# Patient Record
Sex: MALE | Race: WHITE | URBAN - METROPOLITAN AREA
[De-identification: names, ages, dates, MRNs, and addresses within clinical notes are randomized per-mention and may not be internally consistent; named-entity substitution may affect disease eponyms.]

---

## 2017-02-28 ENCOUNTER — PRE VISIT (OUTPATIENT)
Dept: OTOLARYNGOLOGY | Facility: CLINIC | Age: 27
End: 2017-02-28

## 2017-02-28 NOTE — TELEPHONE ENCOUNTER
1.  Date/reason for appt:  3/08/17   Broken Nose    2.  Referring provider:  Self    3.  Call to patient (Yes / No - short description):  No, spouse stated no outside records.  New issue, nothing to gather..  Records reviewed.  All records are in Epic

## 2017-03-08 ENCOUNTER — OFFICE VISIT (OUTPATIENT)
Dept: OTOLARYNGOLOGY | Facility: CLINIC | Age: 27
End: 2017-03-08

## 2017-03-08 VITALS — WEIGHT: 187 LBS | HEIGHT: 72 IN | BODY MASS INDEX: 25.33 KG/M2

## 2017-03-08 DIAGNOSIS — Z87.81 HX OF FRACTURE OF NOSE: ICD-10-CM

## 2017-03-08 DIAGNOSIS — J34.89 NASAL OBSTRUCTION: Primary | ICD-10-CM

## 2017-03-08 DIAGNOSIS — J34.2 DEVIATED NASAL SEPTUM: ICD-10-CM

## 2017-03-08 DIAGNOSIS — M95.0 ACQUIRED NASAL DEFORMITY: ICD-10-CM

## 2017-03-08 ASSESSMENT — PAIN SCALES - GENERAL: PAINLEVEL: NO PAIN (0)
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
## 2017-03-08 NOTE — NURSING NOTE
Chief Complaint   Patient presents with     Consult     nasal fracture      Josafat Francisco LPN
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
## 2017-03-08 NOTE — LETTER
Date:June 20, 2017      Patient was self referred, no letter generated. Do not send.        [facility] Health Information

## 2017-03-09 NOTE — NURSING NOTE
Photodocumentation obtained today, Patient will call when he wants to proceed with prior authorization.

## 2017-06-19 PROBLEM — M95.0 ACQUIRED NASAL DEFORMITY: Status: ACTIVE | Noted: 2017-06-19

## 2017-06-19 PROBLEM — J34.2 DEVIATED NASAL SEPTUM: Status: ACTIVE | Noted: 2017-06-19

## 2017-06-19 PROBLEM — Z87.81: Status: ACTIVE | Noted: 2017-06-19

## 2017-06-19 PROBLEM — J34.89 NASAL OBSTRUCTION: Status: ACTIVE | Noted: 2017-06-19

## 2018-01-24 ENCOUNTER — OFFICE VISIT (OUTPATIENT)
Dept: OTOLARYNGOLOGY | Facility: CLINIC | Age: 28
End: 2018-01-24
Payer: COMMERCIAL

## 2018-01-24 VITALS — BODY MASS INDEX: 24.79 KG/M2 | WEIGHT: 183 LBS | HEIGHT: 72 IN

## 2018-01-24 DIAGNOSIS — J34.829 NASAL VALVE COLLAPSE: ICD-10-CM

## 2018-01-24 DIAGNOSIS — J34.89 NASAL OBSTRUCTION: Primary | ICD-10-CM

## 2018-01-24 DIAGNOSIS — J34.2 DEVIATED NASAL SEPTUM: ICD-10-CM

## 2018-01-24 ASSESSMENT — PAIN SCALES - GENERAL: PAINLEVEL: NO PAIN (0)

## 2018-01-24 NOTE — MR AVS SNAPSHOT
After Visit Summary   1/24/2018    Fito Shah    MRN: [id number]           Patient Information     Date Of Birth          1990        Visit Information        Provider Department      1/24/2018 4:20 PM Anel Arroyo MD [facility] Ear Nose and Throat        Care Instructions    Plan of Care:     We will work to obtain prior authorization for your surgery, once you are approved our surgery scheduler Fanta will call you to schedule surgery. This can take anywhere from 30 days to 90 days.      Your insurance company will send you a letter of approval or denial once they have reached their decision. If you have questions regarding scheduling surgery please call Fanta, [phone]. If you have questions regarding the specific surgery you can call Chantelle, the nurse for Dr. Arroyo to discuss them.         Clinic Information:  1. To schedule an appointment please call [phone], option 1  2. To talk to a Triage RN please call [phone] select option 3  3. Surgery Scheduler for Dr. Arroyo is Fanta, [phone]  3. To speak to Dr. Arroyo's nurse, Chantelle, please call [phone]    Chantelle Cortez RN  1/24/2018 5:38 PM          Follow-ups after your visit        Who to contact     Please call your clinic at [phone] to:    Ask questions about your health    Make or cancel appointments    Discuss your medicines    Learn about your test results    Speak to your doctor   If you have compliments or concerns about an experience at your clinic, or if you wish to file a complaint, please contact [facility] Patient Relations at [phone] or email us at [email or web address]         Additional Information About Your Visit        [employer]hart Information     [employer] is an electronic gateway that provides easy, online access to your medical records. With [employer], you can request a clinic appointment, read your test results, renew a prescription  "or communicate with your care team.     To sign up for [employer] visit the website at [email or web address]   You will be asked to enter the access code listed below, as well as some personal information. Please follow the directions to create your username and password.     Your access code is: [email or web address]  Expires: 2018  5:39 PM     Your access code will  in 90 days. If you need help or a new code, please contact your [facility] or call [phone] for assistance.        Care EveryWhere ID     This is your Care EveryWhere ID. This could be used by other organizations to access your [place] medical records  [id number]        Your Vitals Were     Height BMI (Body Mass Index)                1.84 m (6' 0.44\") 24.52 kg/m2           Blood Pressure from Last 3 Encounters:   No data found for BP    Weight from Last 3 Encounters:   18 83 kg (183 lb)   17 84.8 kg (187 lb)              Today, you had the following     No orders found for display       Primary Care Provider    None Specified       No primary provider on file.        Equal Access to Services     [facility] : Hadii rekha bermudez Socasie, wagrantda otoniel, qajessyta kaalmada farzaneh, kiarra collazo . So [facility] [phone].    ATENCIÓN: Si habla español, tiene a dietz disposición servicios gratuitos de asistencia lingüística. Burtoname al [phone].    We comply with applicable federal civil rights laws and [place] laws. We do not discriminate on the basis of race, color, national origin, age, disability, sex, sexual orientation, or gender identity.            Thank you!     Thank you for choosing [facility] EAR NOSE AND THROAT  for your care. Our goal is always to provide you with excellent care. Hearing back from our patients is one way we can continue to improve our services. Please take a few minutes to complete the written survey that you may receive in the mail after your " visit with us. Thank you!             Your Updated Medication List - Protect others around you: Learn how to safely use, store and throw away your medicines at www.disposemymeds.org.      Notice  As of 1/24/2018  5:39 PM    You have not been prescribed any medications.

## 2018-01-24 NOTE — NURSING NOTE
Photos taken.  Will submit for prior auth and then coordinate surgery.  Pre op teaching completed.  Gave him soap.    Chantelle Cortez RN  1/24/2018 5:51 PM
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
## 2018-01-24 NOTE — LETTER
Date:January 30, 2018      Patient was self referred, no letter generated. Do not send.        [facility] Health Information

## 2018-01-24 NOTE — NURSING NOTE
".  Chief Complaint   Patient presents with     RECHECK     surgical consult    Height 1.84 m (6' 0.44\"), weight 83 kg (183 lb).      Josafat Francisco LPN    "
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
## 2018-01-24 NOTE — PATIENT INSTRUCTIONS
Plan of Care:     We will work to obtain prior authorization for your surgery, once you are approved our surgery scheduler Fanta will call you to schedule surgery. This can take anywhere from 30 days to 90 days.      Your insurance company will send you a letter of approval or denial once they have reached their decision. If you have questions regarding scheduling surgery please call Fanta [phone]. If you have questions regarding the specific surgery you can call Chantelle, the nurse for Dr. Arroyo to discuss them.         Clinic Information:  1. To schedule an appointment please call [phone], option 1  2. To talk to a Triage RN please call [phone] select option 3  3. Surgery Scheduler for Dr. Arroyo is Fanta [phone]  3. To speak to Dr. Arroyo's nurse, Chantelle, please call [phone]    Chantelle Cortez RN  1/24/2018 5:38 PM

## 2018-01-25 DIAGNOSIS — J34.89 NASAL OBSTRUCTION: ICD-10-CM

## 2018-01-25 DIAGNOSIS — J34.829 NASAL VALVE COLLAPSE: ICD-10-CM

## 2018-01-25 DIAGNOSIS — M95.0 NASAL DEFORMITY: ICD-10-CM

## 2018-01-25 DIAGNOSIS — J34.2 DEVIATED NASAL SEPTUM: Primary | ICD-10-CM

## 2018-02-20 ENCOUNTER — TELEPHONE (OUTPATIENT)
Dept: OTOLARYNGOLOGY | Facility: CLINIC | Age: 28
End: 2018-02-20

## 2018-04-26 ENCOUNTER — TRANSFERRED RECORDS (OUTPATIENT)
Dept: HEALTH INFORMATION MANAGEMENT | Facility: CLINIC | Age: 28
End: 2018-04-26

## 2018-05-02 ENCOUNTER — OFFICE VISIT (OUTPATIENT)
Dept: OTOLARYNGOLOGY | Facility: CLINIC | Age: 28
End: 2018-05-02
Payer: COMMERCIAL

## 2018-05-02 VITALS — WEIGHT: 172 LBS | BODY MASS INDEX: 22.8 KG/M2 | HEIGHT: 73 IN

## 2018-05-02 DIAGNOSIS — Z98.890 POSTOPERATIVE STATE: Primary | ICD-10-CM

## 2018-05-02 ASSESSMENT — PAIN SCALES - GENERAL: PAINLEVEL: NO PAIN (0)

## 2018-05-02 NOTE — NURSING NOTE
Cast and doyles removed without difficulty.  Patient was quite dry - instructed him to keep up nasal saline atleast 4 times a day, also to use Aquaphor to nose.  He will return to clinic in 3 weeks for follow up with Dr. Carmenza Krishnamurthy.    Chantelle Cortez RN  5/2/2018 2:08 PM

## 2018-05-02 NOTE — MR AVS SNAPSHOT
"              After Visit Summary   2018    Fito Shah    MRN: [id number]           Patient Information     Date Of Birth          1990        Visit Information        Provider Department      2018 1:00 PM Anel Arroyo MD [facility] Ear Nose and Throat        Today's Diagnoses     Postoperative state    -  1       Follow-ups after your visit        Your next 10 appointments already scheduled     2018 11:20 AM CDT   (Arrive by 11:05 AM)   Return Visit with MD ISABEL Wolff [facility]ose [facility] Th[facility] ([facility] [place])    [address] [place] [id number] [phone]              Who to contact     Please call your clinic at [phone] to:    Ask questions about your health    Make or cancel appointments    Discuss your medicines    Learn about your test results    Speak to your doctor            Additional Information About Your Visit        MyChart Information     [employer] is an electronic gateway that provides easy, online access to your medical records. With GameCr[employer], you can request a clinic appointment, read your test results, renew a prescription or communicate with your care team.     To sign up for FINDING ROVERt visit the website at [email or web address]   You will be asked to enter the access code listed below, as well as some personal information. Please follow the directions to create your username and password.     Your access code is: [email or web address]  Expires: 2018  6:30 AM     Your access code will  in 90 days. If you need help or a new code, please contact your [facility] or call [phone] for assistance.        Care EveryWhere ID     This is your Care EveryWhere ID. This could be used by other organizations to access your [place] medical records  [id number]        Your Vitals Were     Height BMI (Body Mass Index)                1.86 m (6' 1.23\") 22.55 kg/m2           " Blood Pressure from Last 3 Encounters:   No data found for BP    Weight from Last 3 Encounters:   05/02/18 78 kg (172 lb)   01/24/18 83 kg (183 lb)   03/08/17 84.8 kg (187 lb)              Today, you had the following     No orders found for display       Primary Care Provider    None Specified       No primary provider on file.        Equal Access to Services     [facility]: Hadii rekha glez hadtrinao Socasie, wagrantda luqadaha, qajessyta kaalmada farzaneh, kiarra mataaustenkayla collazo . So [facility] [phone].    ATENCIÓN: Si habla español, tiene a dietz disposición servicios gratuitos de asistencia lingüística. Llame al [phone].    We comply with applicable federal civil rights laws and [place] laws. We do not discriminate on the basis of race, color, national origin, age, disability, sex, sexual orientation, or gender identity.            Thank you!     Thank you for choosing [facility] EAR NOSE AND THROAT  for your care. Our goal is always to provide you with excellent care. Hearing back from our patients is one way we can continue to improve our services. Please take a few minutes to complete the written survey that you may receive in the mail after your visit with us. Thank you!             Your Updated Medication List - Protect others around you: Learn how to safely use, store and throw away your medicines at www.disposemymeds.org.      Notice  As of 5/2/2018 11:59 PM    You have not been prescribed any medications.

## 2018-05-02 NOTE — NURSING NOTE
"Chief Complaint   Patient presents with     RECHECK     Post op-septo rhinoplasty      Height 1.86 m (6' 1.23\"), weight 78 kg (172 lb).    Josafat Francisco LPN    "

## 2018-05-02 NOTE — LETTER
Date:May 22, 2018      Patient was self referred, no letter generated. Do not send.        [facility] Health Information

## 2018-05-02 NOTE — LETTER
5/2/2018       RE: Fito Shah  [address] [place] [facility] [address]     Dear Colleague,    Thank you for referring your patient, Fito Shah, to the [facility] EAR NOSE AND THROAT at [facility]. Please see a copy of my visit note below.    Facial Plastic and Reconstructive Surgery Post Op Note    Fito Shah presents for post operative evaluation today, one week after an open septorhinoplasty for nasal obstruction     SUBJECTIVE:  The patient has been doing well with no complaints and in healing appropriately.  Nasal saline has been used.    EXAM:  Nasal splints and nasal cast was removed today. The skin was cleaned to remove adhesive. Bilateral nasal passages were cleaned of mucus.     On physical examination there is evident edema.  Ecchymosis in the periorbital regions is present.  Columellar incision appears to be well-healed and sutures were trimmed.      ASSESSMENT AND PLAN:    Fito Shah Is doing quite well after surgery.  Healing is per routine and there are no concerns.  A discussion was had with the patient today about appropriate wound care which includes daily cleaning and ointment to the incision line in addition to nasal saline to bilateral nostrils.  We instructed that ice could be used for comfort however at this point will not be helping the swelling.  We reiterated to the patient the length of time that it takes for the swelling of the nasal skin to resolve in addition to discussing the gradual improvement of sensation to the nasal skin.  We discussed once again that it takes a full year to fully recover from the surgery and at this point do not anticipate any concerns.    Patient will follow up with me in 2-3 weeks' time or were sooner any concerns arise.      Again, thank you for allowing me to participate in the care of your patient.      Sincerely,    Anel Arroyo MD

## 2018-05-17 ENCOUNTER — TELEPHONE (OUTPATIENT)
Dept: OTOLARYNGOLOGY | Facility: CLINIC | Age: 28
End: 2018-05-17

## 2018-05-21 NOTE — PROGRESS NOTES
Facial Plastic and Reconstructive Surgery Post Op Note    Fito Shah presents for post operative evaluation today, one week after an open septorhinoplasty for nasal obstruction     SUBJECTIVE:  The patient has been doing well with no complaints and in healing appropriately.  Nasal saline has been used.    EXAM:  Nasal splints and nasal cast was removed today. The skin was cleaned to remove adhesive. Bilateral nasal passages were cleaned of mucus.     On physical examination there is evident edema.  Ecchymosis in the periorbital regions is present.  Columellar incision appears to be well-healed and sutures were trimmed.      ASSESSMENT AND PLAN:    Fito Shah Is doing quite well after surgery.  Healing is per routine and there are no concerns.  A discussion was had with the patient today about appropriate wound care which includes daily cleaning and ointment to the incision line in addition to nasal saline to bilateral nostrils.  We instructed that ice could be used for comfort however at this point will not be helping the swelling.  We reiterated to the patient the length of time that it takes for the swelling of the nasal skin to resolve in addition to discussing the gradual improvement of sensation to the nasal skin.  We discussed once again that it takes a full year to fully recover from the surgery and at this point do not anticipate any concerns.    Patient will follow up with me in 2-3 weeks' time or were sooner any concerns arise.

## 2018-06-06 ENCOUNTER — OFFICE VISIT (OUTPATIENT)
Dept: OTOLARYNGOLOGY | Facility: CLINIC | Age: 28
End: 2018-06-06
Payer: COMMERCIAL

## 2018-06-06 VITALS
SYSTOLIC BLOOD PRESSURE: 114 MMHG | BODY MASS INDEX: 23.46 KG/M2 | DIASTOLIC BLOOD PRESSURE: 68 MMHG | HEIGHT: 73 IN | WEIGHT: 177 LBS

## 2018-06-06 DIAGNOSIS — Z98.890 POSTOPERATIVE STATE: Primary | ICD-10-CM

## 2018-06-06 ASSESSMENT — PAIN SCALES - GENERAL: PAINLEVEL: NO PAIN (0)

## 2018-06-06 NOTE — MR AVS SNAPSHOT
"              After Visit Summary   2018    Fito Shah    MRN: [id number]           Patient Information     Date Of Birth          1990        Visit Information        Provider Department      2018 11:20 AM Anel Arroyo MD [facility] Ear Nose and Throat        Today's Diagnoses     Postoperative state    -  1       Follow-ups after your visit        Who to contact     Please call your clinic at [phone] to:    Ask questions about your health    Make or cancel appointments    Discuss your medicines    Learn about your test results    Speak to your doctor            Additional Information About Your Visit        MyChart Information     [employer] (STIE) is an electronic gateway that provides easy, online access to your medical records. With [employer] (STIE), you can request a clinic appointment, read your test results, renew a prescription or communicate with your care team.     To sign up for mSellert visit the website at [email or web address]   You will be asked to enter the access code listed below, as well as some personal information. Please follow the directions to create your username and password.     Your access code is: [email or web address]  Expires: 2018  6:30 AM     Your access code will  in 90 days. If you need help or a new code, please contact your [facility] or call [phone] for assistance.        Care EveryWhere ID     This is your Care EveryWhere ID. This could be used by other organizations to access your [place] medical records  [id number]        Your Vitals Were     Height BMI (Body Mass Index)                1.854 m (6' 1\") 23.35 kg/m2           Blood Pressure from Last 3 Encounters:   18 114/68    Weight from Last 3 Encounters:   18 80.3 kg (177 lb)   18 78 kg (172 lb)   18 83 kg (183 lb)              Today, you had the following     No orders found for display       Primary Care Provider    None Specified       No " primary provider on file.        Equal Access to Services     [facility] : Hadii rekha Eckert, eden frederick, kiarra munoz. So [facility] [phone].    ATENCIÓN: Si habla español, tiene a dietz disposición servicios gratuitos de asistencia lingüística. Llame al [phone].    We comply with applicable federal civil rights laws and [place] laws. We do not discriminate on the basis of race, color, national origin, age, disability, sex, sexual orientation, or gender identity.            Thank you!     Thank you for choosing [facility] EAR NOSE AND THROAT  for your care. Our goal is always to provide you with excellent care. Hearing back from our patients is one way we can continue to improve our services. Please take a few minutes to complete the written survey that you may receive in the mail after your visit with us. Thank you!             Your Updated Medication List - Protect others around you: Learn how to safely use, store and throw away your medicines at www.disposemymeds.org.      Notice  As of 6/6/2018  6:45 PM    You have not been prescribed any medications.

## 2018-06-06 NOTE — LETTER
Date:June 7, 2018      Patient was self referred, no letter generated. Do not send.        [facility] Health Information

## 2018-06-06 NOTE — PROGRESS NOTES
Facial Plastic and Reconstructive Surgery    Fito Shah is doing well and he is happy with his breathing. It has improved significantly.    There is shift of the nasal tip.   Exam shows a patent nasal airway with dry nasal mucosa.    I would like him to mold and went over the regimen with him. I also discussed how important it is to use nasal moisture.    I will see him back in a month.

## 2018-06-06 NOTE — LETTER
6/6/2018       RE: Fito Shah  [address]  [place] [address]     Dear Colleague,    Thank you for referring your patient, Fito Shah, to the [facility] EAR NOSE AND THROAT at [facility]. Please see a copy of my visit note below.    Facial Plastic and Reconstructive Surgery    Fito Shah is doing well and he is happy with his breathing. It has improved significantly.    There is shift of the nasal tip.   Exam shows a patent nasal airway with dry nasal mucosa.    I would like him to mold and went over the regimen with him. I also discussed how important it is to use nasal moisture.    I will see him back in a month.    Again, thank you for allowing me to participate in the care of your patient.      Sincerely,    Anel Arroyo MD
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
## 2025-06-18 ENCOUNTER — HOSPITAL ENCOUNTER (OUTPATIENT)
Dept: CARDIOLOGY | Facility: CLINIC | Age: 35
Discharge: HOME | End: 2025-06-18

## 2025-06-18 DIAGNOSIS — Z82.49 FAMILY HISTORY OF CARDIOMYOPATHY: ICD-10-CM

## 2025-06-18 PROCEDURE — 93325 DOPPLER ECHO COLOR FLOW MAPG: CPT | Mod: TC

## 2025-06-18 PROCEDURE — 255N000002 HC RX 255 OP 636: Performed by: INTERNAL MEDICINE

## 2025-06-18 RX ADMIN — PERFLUTREN 5 ML (DILUTED): 6.52 INJECTION, SUSPENSION INTRAVENOUS at 10:22

## 2025-06-28 ENCOUNTER — HEALTH MAINTENANCE LETTER (OUTPATIENT)
Age: 35
End: 2025-06-28